# Patient Record
Sex: MALE | Race: WHITE | NOT HISPANIC OR LATINO | Employment: OTHER | URBAN - METROPOLITAN AREA
[De-identification: names, ages, dates, MRNs, and addresses within clinical notes are randomized per-mention and may not be internally consistent; named-entity substitution may affect disease eponyms.]

---

## 2018-12-16 ENCOUNTER — HOSPITAL ENCOUNTER (EMERGENCY)
Facility: HOSPITAL | Age: 28
Discharge: HOME | End: 2018-12-16
Admitting: EMERGENCY MEDICINE

## 2018-12-16 ENCOUNTER — APPOINTMENT (OUTPATIENT)
Dept: CT IMAGING | Facility: HOSPITAL | Age: 28
End: 2018-12-16

## 2018-12-16 VITALS
HEART RATE: 74 BPM | OXYGEN SATURATION: 100 % | HEIGHT: 70 IN | RESPIRATION RATE: 18 BRPM | WEIGHT: 180 LBS | TEMPERATURE: 99.4 F | SYSTOLIC BLOOD PRESSURE: 112 MMHG | BODY MASS INDEX: 25.77 KG/M2 | DIASTOLIC BLOOD PRESSURE: 68 MMHG

## 2018-12-16 DIAGNOSIS — A08.4 VIRAL GASTROENTERITIS: Primary | ICD-10-CM

## 2018-12-16 LAB
ALBUMIN SERPL-MCNC: 4.5 G/DL (ref 3.5–5.2)
ALBUMIN/GLOB SERPL: 1.5 G/DL
ALP SERPL-CCNC: 62 U/L (ref 40–129)
ALT SERPL W P-5'-P-CCNC: 18 U/L (ref 5–41)
ANION GAP SERPL CALCULATED.3IONS-SCNC: 17.3 MMOL/L
AST SERPL-CCNC: 21 U/L (ref 5–40)
BACTERIA UR QL AUTO: ABNORMAL /HPF
BASOPHILS # BLD AUTO: 0.03 10*3/MM3 (ref 0–0.2)
BASOPHILS NFR BLD AUTO: 0.3 % (ref 0–2)
BILIRUB SERPL-MCNC: 1.4 MG/DL (ref 0.2–1.2)
BILIRUB UR QL STRIP: ABNORMAL
BUN BLD-MCNC: 22 MG/DL (ref 6–20)
BUN/CREAT SERPL: 17.6 (ref 7–25)
CALCIUM SPEC-SCNC: 9.3 MG/DL (ref 8.6–10.5)
CHLORIDE SERPL-SCNC: 102 MMOL/L (ref 98–107)
CLARITY UR: CLEAR
CO2 SERPL-SCNC: 22.7 MMOL/L (ref 22–29)
COLOR UR: YELLOW
CREAT BLD-MCNC: 1.25 MG/DL (ref 0.76–1.27)
DEPRECATED RDW RBC AUTO: 38.1 FL (ref 37–54)
EOSINOPHIL # BLD AUTO: 0.01 10*3/MM3 (ref 0.1–0.3)
EOSINOPHIL NFR BLD AUTO: 0.1 % (ref 0–4)
ERYTHROCYTE [DISTWIDTH] IN BLOOD BY AUTOMATED COUNT: 11.9 % (ref 11.5–14.5)
GFR SERPL CREATININE-BSD FRML MDRD: 69 ML/MIN/1.73
GLOBULIN UR ELPH-MCNC: 3 GM/DL
GLUCOSE BLD-MCNC: 113 MG/DL (ref 65–99)
GLUCOSE UR STRIP-MCNC: NEGATIVE MG/DL
HCT VFR BLD AUTO: 51.1 % (ref 42–52)
HGB BLD-MCNC: 17.7 G/DL (ref 14–18)
HGB UR QL STRIP.AUTO: ABNORMAL
HOLD SPECIMEN: NORMAL
HOLD SPECIMEN: NORMAL
HYALINE CASTS UR QL AUTO: ABNORMAL /LPF
IMM GRANULOCYTES # BLD: 0.03 10*3/MM3 (ref 0–0.03)
IMM GRANULOCYTES NFR BLD: 0.3 % (ref 0–0.5)
KETONES UR QL STRIP: ABNORMAL
LEUKOCYTE ESTERASE UR QL STRIP.AUTO: NEGATIVE
LIPASE SERPL-CCNC: 17 U/L (ref 13–60)
LYMPHOCYTES # BLD AUTO: 0.39 10*3/MM3 (ref 0.6–4.8)
LYMPHOCYTES NFR BLD AUTO: 3.3 % (ref 20–45)
MCH RBC QN AUTO: 30.6 PG (ref 27–31)
MCHC RBC AUTO-ENTMCNC: 34.6 G/DL (ref 31–37)
MCV RBC AUTO: 88.4 FL (ref 80–94)
MONOCYTES # BLD AUTO: 0.5 10*3/MM3 (ref 0–1)
MONOCYTES NFR BLD AUTO: 4.2 % (ref 3–8)
MUCOUS THREADS URNS QL MICRO: ABNORMAL /HPF
NEUTROPHILS # BLD AUTO: 11.01 10*3/MM3 (ref 1.5–8.3)
NEUTROPHILS NFR BLD AUTO: 91.8 % (ref 45–70)
NITRITE UR QL STRIP: NEGATIVE
NRBC BLD MANUAL-RTO: 0 /100 WBC (ref 0–0)
PH UR STRIP.AUTO: 5.5 [PH] (ref 4.5–8)
PLATELET # BLD AUTO: 142 10*3/MM3 (ref 140–500)
PMV BLD AUTO: 11 FL (ref 7.4–10.4)
POTASSIUM BLD-SCNC: 4 MMOL/L (ref 3.5–5.2)
PROT SERPL-MCNC: 7.5 G/DL (ref 6–8.5)
PROT UR QL STRIP: NEGATIVE
RBC # BLD AUTO: 5.78 10*6/MM3 (ref 4.7–6.1)
RBC # UR: ABNORMAL /HPF
REF LAB TEST METHOD: ABNORMAL
SODIUM BLD-SCNC: 142 MMOL/L (ref 136–145)
SP GR UR STRIP: 1.02 (ref 1–1.03)
SQUAMOUS #/AREA URNS HPF: ABNORMAL /HPF
UROBILINOGEN UR QL STRIP: ABNORMAL
WBC NRBC COR # BLD: 11.97 10*3/MM3 (ref 4.8–10.8)
WBC UR QL AUTO: ABNORMAL /HPF
WHOLE BLOOD HOLD SPECIMEN: NORMAL
WHOLE BLOOD HOLD SPECIMEN: NORMAL

## 2018-12-16 PROCEDURE — 0 IOPAMIDOL PER 1 ML: Performed by: NURSE PRACTITIONER

## 2018-12-16 PROCEDURE — 81001 URINALYSIS AUTO W/SCOPE: CPT | Performed by: NURSE PRACTITIONER

## 2018-12-16 PROCEDURE — 25010000002 MORPHINE PER 10 MG: Performed by: EMERGENCY MEDICINE

## 2018-12-16 PROCEDURE — 80053 COMPREHEN METABOLIC PANEL: CPT | Performed by: NURSE PRACTITIONER

## 2018-12-16 PROCEDURE — 74177 CT ABD & PELVIS W/CONTRAST: CPT

## 2018-12-16 PROCEDURE — 99283 EMERGENCY DEPT VISIT LOW MDM: CPT

## 2018-12-16 PROCEDURE — 85025 COMPLETE CBC W/AUTO DIFF WBC: CPT | Performed by: NURSE PRACTITIONER

## 2018-12-16 PROCEDURE — 25010000002 ONDANSETRON PER 1 MG: Performed by: NURSE PRACTITIONER

## 2018-12-16 PROCEDURE — 83690 ASSAY OF LIPASE: CPT | Performed by: NURSE PRACTITIONER

## 2018-12-16 PROCEDURE — 96361 HYDRATE IV INFUSION ADD-ON: CPT

## 2018-12-16 PROCEDURE — 99284 EMERGENCY DEPT VISIT MOD MDM: CPT | Performed by: NURSE PRACTITIONER

## 2018-12-16 PROCEDURE — 96375 TX/PRO/DX INJ NEW DRUG ADDON: CPT

## 2018-12-16 PROCEDURE — 96374 THER/PROPH/DIAG INJ IV PUSH: CPT

## 2018-12-16 RX ORDER — ONDANSETRON 2 MG/ML
4 INJECTION INTRAMUSCULAR; INTRAVENOUS ONCE
Status: COMPLETED | OUTPATIENT
Start: 2018-12-16 | End: 2018-12-16

## 2018-12-16 RX ORDER — SODIUM CHLORIDE 0.9 % (FLUSH) 0.9 %
10 SYRINGE (ML) INJECTION AS NEEDED
Status: DISCONTINUED | OUTPATIENT
Start: 2018-12-16 | End: 2018-12-16 | Stop reason: HOSPADM

## 2018-12-16 RX ORDER — LOPERAMIDE HYDROCHLORIDE 2 MG/1
2 CAPSULE ORAL 4 TIMES DAILY PRN
Qty: 20 CAPSULE | Refills: 0 | Status: SHIPPED | OUTPATIENT
Start: 2018-12-16 | End: 2018-12-21

## 2018-12-16 RX ORDER — ONDANSETRON 4 MG/1
4 TABLET, ORALLY DISINTEGRATING ORAL EVERY 6 HOURS PRN
Qty: 12 TABLET | Refills: 0 | Status: SHIPPED | OUTPATIENT
Start: 2018-12-16 | End: 2021-08-31

## 2018-12-16 RX ADMIN — MORPHINE SULFATE 4 MG: 4 INJECTION, SOLUTION INTRAMUSCULAR; INTRAVENOUS at 17:53

## 2018-12-16 RX ADMIN — IOPAMIDOL 100 ML: 755 INJECTION, SOLUTION INTRAVENOUS at 18:20

## 2018-12-16 RX ADMIN — SODIUM CHLORIDE 1000 ML: 9 INJECTION, SOLUTION INTRAVENOUS at 17:49

## 2018-12-16 RX ADMIN — ONDANSETRON 4 MG: 2 INJECTION, SOLUTION INTRAMUSCULAR; INTRAVENOUS at 17:51

## 2018-12-16 NOTE — ED PROVIDER NOTES
Subjective   History of Present Illness  History of Present Illness    Chief complaint: nausea, vomiting, abdominal pain    Location: general abdomen    Quality/Severity:  moderate    Timing/Duration: started today this morning     Modifying Factors: nothing makes it worse or better     Associated Symptoms: none    Narrative: 28 year old male accompanied with his wife with complaints of abdominal pain along with vomiting and diarrhea that started today.  His wife stated she has been ill and vomited yesterday and their child has also been sick with similar symptoms.  The patient is sobbing and whaling loudly in the room.  He reports no allergies, no fever, and no abdominal surgeries.   No dysuria, no bleeding seen in bowels or urine.      Review of Systems  General: Denies fevers or chills.  Denies any weakness or fatigue.  Denies any weight loss or weight gain.  SKIN: Denies any rashes lesions or ulcers.  Denies color change.  ENT: Denies sore throat or rhinorrhea.  Denies ear pain.    EYES: Denies any blurred vision.  Denies any change in vision.  Denies any photophobia.  Denies any vision loss.  LUNGS: Denies any shortness of breath or wheezing.  Denies any cough.  Denies any hemoptysis.  CARDIAC: Denies any chest pain.  Denies palpitations.  Denies syncope.  Denies any edema  ABD: + abdominal pain.  + nausea, vomiting, and  diarrhea.  Denies any rectal bleeding.  Denies constipation  : Denies any dysuria, urgency, frequency or hematuria.  Denies discharge.  Denies flank pain.  NEURO: Denies any focal weakness.  Denies headache.  Denies seizures.  Denies changes in speech or difficulty walking.  ENDOCRINE: Denies polydipsia and polyuria  M/S: Denies arthralgias, back pain, myalgias or neck pain  HEME/LYMPH: Negative for adenopathy. Does not bruise/bleed easily.   PSYCH: Negative for suicidal ideas. Denies anxiety or depression  review was performed in addition to those in the above all other reviews are  negative.    History reviewed. No pertinent past medical history.    No Known Allergies    Past Surgical History:   Procedure Laterality Date   • SHOULDER ARTHROSCOPY         History reviewed. No pertinent family history.    Social History     Socioeconomic History   • Marital status:      Spouse name: Not on file   • Number of children: Not on file   • Years of education: Not on file   • Highest education level: Not on file   Tobacco Use   • Smoking status: Never Smoker   Substance and Sexual Activity   • Alcohol use: No     Frequency: Never   • Drug use: No       Current Facility-Administered Medications:   •  morphine injection 4 mg, 4 mg, Intravenous, Once, Marvin Woodruff MD  •  ondansetron (ZOFRAN) injection 4 mg, 4 mg, Intravenous, Once, Viviana Barlow APRN  •  sodium chloride 0.9 % bolus 1,000 mL, 1,000 mL, Intravenous, Once, Viviana Barlow APRN  •  sodium chloride 0.9 % flush 10 mL, 10 mL, Intravenous, PRN, Viviana Barlow APRN  No current outpatient medications on file.    Vitals:    12/16/18 1711   BP: 110/76   Pulse: 106   Resp: 20   Temp: 98 °F (36.7 °C)   SpO2: 98%           Objective   Physical Exam  General: NAD, alert and oriented x 4  HEENT: NCAT, PERRL, oral mucosa moist  Cardiac: S1, S2, RRR, no murmur, no edema  Pulmonary: CTA bilaterally, no wheezing   Abdomen: soft, non-tender, non-distended, no organmegally, no rebound tenderness   : Voids independently, no CVA tenderness   Musculoskeletal: Moves all extremities, equal strength bilaterally  Neuro: alert and oriented x 3, CN 2 - 12 grossly intact  Skin: warm, dry, and intact    Procedures           ED Course    Patient seen and examined.  Labs ordered along with 1 liter normal saline, 4 mg IV morphine, 4 mg of IV Zofran, and a CT abdomen/pelvis with IV contrast.    Reviewed CT abdomen/pelvis W IV contrast. Independently viewed by me. Interpreted by radiologist, Dr. Perdomo. Discussed with patient.  Normal Appendix, Fluid filled  loops of small bowel, otherwise normal.    Given patient symptoms and negative abdomen at this time I feel that he has a viral infection.  Spouse and child also with similar symptoms in house.  Will discharge the patient home with zofran as well as loperamide.  Advised to stay hydrated with water.     He does not have a PCP, provided him information with physicians in the area.    Return to the ED with any worsening symptoms.      Return to the emergency department with worsening symptoms, uncontrolled pain, inability to tolerate oral liquids, fever greater than 101°F not controlled by Tylenol or as needed with emergent concerns.    Results for orders placed or performed during the hospital encounter of 12/16/18   Comprehensive Metabolic Panel   Result Value Ref Range    Glucose 113 (H) 65 - 99 mg/dL    BUN 22 (H) 6 - 20 mg/dL    Creatinine 1.25 0.76 - 1.27 mg/dL    Sodium 142 136 - 145 mmol/L    Potassium 4.0 3.5 - 5.2 mmol/L    Chloride 102 98 - 107 mmol/L    CO2 22.7 22.0 - 29.0 mmol/L    Calcium 9.3 8.6 - 10.5 mg/dL    Total Protein 7.5 6.0 - 8.5 g/dL    Albumin 4.50 3.50 - 5.20 g/dL    ALT (SGPT) 18 5 - 41 U/L    AST (SGOT) 21 5 - 40 U/L    Alkaline Phosphatase 62 40 - 129 U/L    Total Bilirubin 1.4 (H) 0.2 - 1.2 mg/dL    eGFR Non African Amer 69 >60 mL/min/1.73    Globulin 3.0 gm/dL    A/G Ratio 1.5 g/dL    BUN/Creatinine Ratio 17.6 7.0 - 25.0    Anion Gap 17.3 mmol/L   Lipase   Result Value Ref Range    Lipase 17 13 - 60 U/L   Urinalysis With Microscopic If Indicated (No Culture) - Urine, Clean Catch   Result Value Ref Range    Color, UA Yellow Yellow, Straw    Appearance, UA Clear Clear    pH, UA 5.5 4.5 - 8.0    Specific Gravity, UA 1.020 1.003 - 1.030    Glucose, UA Negative Negative    Ketones, UA >=160 mg/dL (4+) Negative, 80 mg/dL (3+), >=160 mg/dL (4+)    Bilirubin, UA Small (1+) (A) Negative    Blood, UA Trace (A) Negative    Protein, UA Negative Negative    Leuk Esterase, UA Negative Negative     Nitrite, UA Negative Negative    Urobilinogen, UA 0.2 E.U./dL 0.2 - 1.0 E.U./dL   CBC Auto Differential   Result Value Ref Range    WBC 11.97 (H) 4.80 - 10.80 10*3/mm3    RBC 5.78 4.70 - 6.10 10*6/mm3    Hemoglobin 17.7 14.0 - 18.0 g/dL    Hematocrit 51.1 42.0 - 52.0 %    MCV 88.4 80.0 - 94.0 fL    MCH 30.6 27.0 - 31.0 pg    MCHC 34.6 31.0 - 37.0 g/dL    RDW 11.9 11.5 - 14.5 %    RDW-SD 38.1 37.0 - 54.0 fl    MPV 11.0 (H) 7.4 - 10.4 fL    Platelets 142 140 - 500 10*3/mm3    Neutrophil % 91.8 (H) 45.0 - 70.0 %    Lymphocyte % 3.3 (L) 20.0 - 45.0 %    Monocyte % 4.2 3.0 - 8.0 %    Eosinophil % 0.1 0.0 - 4.0 %    Basophil % 0.3 0.0 - 2.0 %    Immature Grans % 0.3 0.0 - 0.5 %    Neutrophils, Absolute 11.01 (H) 1.50 - 8.30 10*3/mm3    Lymphocytes, Absolute 0.39 (L) 0.60 - 4.80 10*3/mm3    Monocytes, Absolute 0.50 0.00 - 1.00 10*3/mm3    Eosinophils, Absolute 0.01 (L) 0.10 - 0.30 10*3/mm3    Basophils, Absolute 0.03 0.00 - 0.20 10*3/mm3    Immature Grans, Absolute 0.03 0.00 - 0.03 10*3/mm3    nRBC 0.0 0.0 - 0.0 /100 WBC   Urinalysis, Microscopic Only - Urine, Clean Catch   Result Value Ref Range    RBC, UA 0-2 (A) None Seen /HPF    WBC, UA 0-2 (A) None Seen /HPF    Bacteria, UA Trace (A) None Seen /HPF    Squamous Epithelial Cells, UA 0-2 None Seen, 0-2 /HPF    Hyaline Casts, UA None Seen None Seen /LPF    Mucus, UA Trace None Seen, Trace /HPF    Methodology Manual Light Microscopy    Light Blue Top   Result Value Ref Range    Extra Tube hold for add-on    Green Top (Gel)   Result Value Ref Range    Extra Tube Hold for add-ons.    Lavender Top   Result Value Ref Range    Extra Tube hold for add-on    Gold Top - SST   Result Value Ref Range    Extra Tube Hold for add-ons.                    MDM  Number of Diagnoses or Management Options  Viral gastroenteritis: new and requires workup     Amount and/or Complexity of Data Reviewed  Clinical lab tests: reviewed  Tests in the radiology section of CPT®: reviewed    Risk of  Complications, Morbidity, and/or Mortality  Presenting problems: moderate  Diagnostic procedures: moderate  Management options: moderate    Patient Progress  Patient progress: improved        Final diagnoses:   Viral gastroenteritis            Viviana Barlow, APRN  12/16/18 1922

## 2018-12-17 NOTE — ED NOTES
Assumed care of this patient. No c/o offered at this time. Awaiting disposition     Elsie Villalobos RN  12/16/18 1925

## 2018-12-17 NOTE — DISCHARGE INSTRUCTIONS
Follow up with a primary physician this week.  Take medication as prescribed.  Return to the ED with any worsening symptoms.  Stay hydrated with water.      Return to the emergency department with worsening symptoms, uncontrolled pain, inability to tolerate oral liquids, fever greater than 101° F not controlled by Tylenol or as needed with emergent concerns.

## 2022-03-12 ENCOUNTER — HOSPITAL ENCOUNTER (EMERGENCY)
Facility: HOSPITAL | Age: 32
Discharge: HOME | End: 2022-03-13
Attending: EMERGENCY MEDICINE | Admitting: EMERGENCY MEDICINE

## 2022-03-12 DIAGNOSIS — T75.4XXA ELECTRIC SHOCK, INITIAL ENCOUNTER: ICD-10-CM

## 2022-03-12 DIAGNOSIS — R00.2 PALPITATIONS: Primary | ICD-10-CM

## 2022-03-12 LAB
BASOPHILS # BLD AUTO: 0.04 10*3/MM3 (ref 0–0.2)
BASOPHILS NFR BLD AUTO: 0.6 % (ref 0–1.5)
DEPRECATED RDW RBC AUTO: 41.8 FL (ref 37–54)
EOSINOPHIL # BLD AUTO: 0.16 10*3/MM3 (ref 0–0.4)
EOSINOPHIL NFR BLD AUTO: 2.5 % (ref 0.3–6.2)
ERYTHROCYTE [DISTWIDTH] IN BLOOD BY AUTOMATED COUNT: 12 % (ref 12.3–15.4)
HCT VFR BLD AUTO: 43.5 % (ref 37.5–51)
HGB BLD-MCNC: 14.4 G/DL (ref 13–17.7)
IMM GRANULOCYTES # BLD AUTO: 0 10*3/MM3 (ref 0–0.05)
IMM GRANULOCYTES NFR BLD AUTO: 0 % (ref 0–0.5)
LYMPHOCYTES # BLD AUTO: 2.11 10*3/MM3 (ref 0.7–3.1)
LYMPHOCYTES NFR BLD AUTO: 33 % (ref 19.6–45.3)
MCH RBC QN AUTO: 30.8 PG (ref 26.6–33)
MCHC RBC AUTO-ENTMCNC: 33.1 G/DL (ref 31.5–35.7)
MCV RBC AUTO: 92.9 FL (ref 79–97)
MONOCYTES # BLD AUTO: 0.76 10*3/MM3 (ref 0.1–0.9)
MONOCYTES NFR BLD AUTO: 11.9 % (ref 5–12)
NEUTROPHILS NFR BLD AUTO: 3.32 10*3/MM3 (ref 1.7–7)
NEUTROPHILS NFR BLD AUTO: 52 % (ref 42.7–76)
PLATELET # BLD AUTO: 148 10*3/MM3 (ref 140–450)
PMV BLD AUTO: 10.6 FL (ref 6–12)
RBC # BLD AUTO: 4.68 10*6/MM3 (ref 4.14–5.8)
WBC NRBC COR # BLD: 6.39 10*3/MM3 (ref 3.4–10.8)

## 2022-03-12 PROCEDURE — 80053 COMPREHEN METABOLIC PANEL: CPT | Performed by: EMERGENCY MEDICINE

## 2022-03-12 PROCEDURE — 93005 ELECTROCARDIOGRAM TRACING: CPT | Performed by: EMERGENCY MEDICINE

## 2022-03-12 PROCEDURE — 93010 ELECTROCARDIOGRAM REPORT: CPT | Performed by: INTERNAL MEDICINE

## 2022-03-12 PROCEDURE — 84443 ASSAY THYROID STIM HORMONE: CPT | Performed by: EMERGENCY MEDICINE

## 2022-03-12 PROCEDURE — 99283 EMERGENCY DEPT VISIT LOW MDM: CPT

## 2022-03-12 PROCEDURE — 83735 ASSAY OF MAGNESIUM: CPT | Performed by: EMERGENCY MEDICINE

## 2022-03-12 PROCEDURE — 93005 ELECTROCARDIOGRAM TRACING: CPT

## 2022-03-12 PROCEDURE — 36415 COLL VENOUS BLD VENIPUNCTURE: CPT

## 2022-03-12 PROCEDURE — 85025 COMPLETE CBC W/AUTO DIFF WBC: CPT | Performed by: EMERGENCY MEDICINE

## 2022-03-12 PROCEDURE — 84484 ASSAY OF TROPONIN QUANT: CPT | Performed by: EMERGENCY MEDICINE

## 2022-03-12 RX ORDER — VALACYCLOVIR HYDROCHLORIDE 500 MG/1
500 TABLET, FILM COATED ORAL DAILY
COMMUNITY

## 2022-03-13 VITALS
SYSTOLIC BLOOD PRESSURE: 138 MMHG | DIASTOLIC BLOOD PRESSURE: 82 MMHG | WEIGHT: 182 LBS | HEIGHT: 70 IN | HEART RATE: 61 BPM | RESPIRATION RATE: 16 BRPM | TEMPERATURE: 98.8 F | OXYGEN SATURATION: 99 % | BODY MASS INDEX: 26.05 KG/M2

## 2022-03-13 LAB
ALBUMIN SERPL-MCNC: 4.2 G/DL (ref 3.5–5.2)
ALBUMIN/GLOB SERPL: 1.6 G/DL
ALP SERPL-CCNC: 55 U/L (ref 39–117)
ALT SERPL W P-5'-P-CCNC: 28 U/L (ref 1–41)
ANION GAP SERPL CALCULATED.3IONS-SCNC: 8.5 MMOL/L (ref 5–15)
AST SERPL-CCNC: 29 U/L (ref 1–40)
BILIRUB SERPL-MCNC: 0.4 MG/DL (ref 0–1.2)
BUN SERPL-MCNC: 26 MG/DL (ref 6–20)
BUN/CREAT SERPL: 22.6 (ref 7–25)
CALCIUM SPEC-SCNC: 9.7 MG/DL (ref 8.6–10.5)
CHLORIDE SERPL-SCNC: 102 MMOL/L (ref 98–107)
CO2 SERPL-SCNC: 27.5 MMOL/L (ref 22–29)
CREAT SERPL-MCNC: 1.15 MG/DL (ref 0.76–1.27)
EGFRCR SERPLBLD CKD-EPI 2021: 87.8 ML/MIN/1.73
GLOBULIN UR ELPH-MCNC: 2.7 GM/DL
GLUCOSE SERPL-MCNC: 88 MG/DL (ref 65–99)
MAGNESIUM SERPL-MCNC: 1.9 MG/DL (ref 1.6–2.6)
POTASSIUM SERPL-SCNC: 3.7 MMOL/L (ref 3.5–5.2)
PROT SERPL-MCNC: 6.9 G/DL (ref 6–8.5)
QT INTERVAL: 420 MS
QT INTERVAL: 425 MS
SODIUM SERPL-SCNC: 138 MMOL/L (ref 136–145)
TROPONIN T SERPL-MCNC: <0.01 NG/ML (ref 0–0.03)
TSH SERPL DL<=0.05 MIU/L-ACNC: 7.45 UIU/ML (ref 0.27–4.2)

## 2022-03-13 PROCEDURE — 99283 EMERGENCY DEPT VISIT LOW MDM: CPT | Performed by: EMERGENCY MEDICINE

## 2022-03-13 NOTE — ED PROVIDER NOTES
"Subjective   31-year-old male presents for evaluation after having electrical shock earlier this afternoon.  Patient states that around 3 PM he was working on a light fixture that he thought was off when he touched a live wire and was shocked.  He states he briefly became lightheaded but did not lose consciousness.  Patient states that tonight when he went to lay down to go to bed he became concerned about going to sleep so decided to seek evaluation.  He denies chest pain, shortness of breath, headache, weakness, numbness, tingling, nausea, vomiting, muscle aches, joint pain.  Patient did state that his heart \"felt funny\" but he cannot elaborate further on the sensation.  Patient reports history of hypothyroidism.  He denies smoking, illicit drug use, alcohol use.  Patient has not taken any medication prior to arrival.          Review of Systems   All other systems reviewed and are negative.      Past Medical History:   Diagnosis Date   • Genital herpes    • History of Chiari malformation    • Hypothyroidism        No Known Allergies    Past Surgical History:   Procedure Laterality Date   • BRAIN SURGERY     • HERNIA REPAIR  04/2021   • SHOULDER ARTHROSCOPY         History reviewed. No pertinent family history.    Social History     Socioeconomic History   • Marital status:    Tobacco Use   • Smoking status: Never Smoker   • Smokeless tobacco: Never Used   Vaping Use   • Vaping Use: Never used   Substance and Sexual Activity   • Alcohol use: No   • Drug use: No   • Sexual activity: Defer           Objective   Physical Exam  Constitutional:       General: He is not in acute distress.     Appearance: Normal appearance. He is not ill-appearing, toxic-appearing or diaphoretic.   HENT:      Head: Normocephalic and atraumatic.      Mouth/Throat:      Mouth: Mucous membranes are moist.      Pharynx: Oropharynx is clear.   Eyes:      Extraocular Movements: Extraocular movements intact.      Pupils: Pupils are equal, " round, and reactive to light.   Cardiovascular:      Rate and Rhythm: Normal rate and regular rhythm.      Pulses: Normal pulses.      Heart sounds: Normal heart sounds.   Pulmonary:      Effort: Pulmonary effort is normal. No respiratory distress.      Breath sounds: Normal breath sounds.   Abdominal:      General: There is no distension.      Palpations: Abdomen is soft.      Tenderness: There is no abdominal tenderness.   Musculoskeletal:         General: No tenderness, deformity or signs of injury. Normal range of motion.   Skin:     General: Skin is warm and dry.      Comments: Intact   Neurological:      General: No focal deficit present.      Mental Status: He is alert and oriented to person, place, and time. Mental status is at baseline.      Cranial Nerves: No cranial nerve deficit.      Sensory: No sensory deficit.      Motor: No weakness.      Coordination: Coordination normal.      Gait: Gait normal.   Psychiatric:         Mood and Affect: Mood normal.         Behavior: Behavior normal.         Thought Content: Thought content normal.         Judgment: Judgment normal.         Procedures           ED Course  ED Course as of 03/13/22 0043   Sun Mar 13, 2022   0041 EKG obtained at 2348 shows sinus rhythm, rate 69, normal TX and QTC, no ST segment or T wave changes.  Leftward axis. [TD]   0042 Patient overall well-appearing.  Patient has no specific complaint at time of my evaluation.  Work-up here shows slightly elevated TSH but is otherwise unremarkable.  Patient was electrocuted by household 110 voltage which should not cause any issue other than some localized injury, which patient does not have.  Provided reassurance.  Patient stable for discharge. [TD]      ED Course User Index  [TD] Dejan Gillette MD                                                 [facility]    Final diagnoses:   Palpitations   Electric shock, initial encounter       ED Disposition  ED Disposition     ED Disposition   Discharge     Condition   Stable    Comment   --             Primary Care    In 2 days  As needed         Medication List      No changes were made to your prescriptions during this visit.          Dejan Gillette MD  03/13/22 0043

## 2022-03-13 NOTE — ED TRIAGE NOTES
"Reports he was shocked by an electrical outlet while working on them at home at approximately 3p today. He denies CP, SOA, and pain, but states, \"I'm really bad at describing things, I just feel off.\"    Alert, oriented, no distress.  "

## 2024-02-19 ENCOUNTER — PATIENT ROUNDING (BHMG ONLY) (OUTPATIENT)
Dept: FAMILY MEDICINE CLINIC | Facility: CLINIC | Age: 34
End: 2024-02-19
Payer: SELF-PAY

## 2024-02-19 ENCOUNTER — OFFICE VISIT (OUTPATIENT)
Dept: FAMILY MEDICINE CLINIC | Facility: CLINIC | Age: 34
End: 2024-02-19
Payer: SELF-PAY

## 2024-02-19 VITALS
SYSTOLIC BLOOD PRESSURE: 112 MMHG | HEART RATE: 55 BPM | DIASTOLIC BLOOD PRESSURE: 68 MMHG | RESPIRATION RATE: 20 BRPM | BODY MASS INDEX: 28.06 KG/M2 | WEIGHT: 196 LBS | OXYGEN SATURATION: 100 % | HEIGHT: 70 IN

## 2024-02-19 DIAGNOSIS — E03.9 ACQUIRED HYPOTHYROIDISM: ICD-10-CM

## 2024-02-19 DIAGNOSIS — G93.5 CHIARI MALFORMATION TYPE I: ICD-10-CM

## 2024-02-19 DIAGNOSIS — R29.6 FALLING: Primary | ICD-10-CM

## 2024-02-19 PROCEDURE — 36415 COLL VENOUS BLD VENIPUNCTURE: CPT | Performed by: STUDENT IN AN ORGANIZED HEALTH CARE EDUCATION/TRAINING PROGRAM

## 2024-02-19 PROCEDURE — 99204 OFFICE O/P NEW MOD 45 MIN: CPT | Performed by: STUDENT IN AN ORGANIZED HEALTH CARE EDUCATION/TRAINING PROGRAM

## 2024-02-19 NOTE — PROGRESS NOTES
Venipuncture Blood Specimen Collection  Venipuncture performed in left arm by Jess Simpson MA with good hemostasis. Patient tolerated the procedure well without complications.   02/19/24   Jess Simpson MA

## 2024-02-19 NOTE — PROGRESS NOTES
Jovi Li,   [facility]  [address] [place] [address] [address]  [place] [id number] [phone]    Subjective      Name Matias Barrientos MRN [id number]    1990 AGE/SEX 33 y.o. / male      Chief Complaint Chief Complaint   Patient presents with    Balance Issues     New patient here to establish care. Has concerns about falling a lot in the past few months as well as having seizure like symptoms and shaking.          Visit History for  2024    History of Present Illness  Matias Barrientos is a 33 y.o. male who presented today for Balance Issues (New patient here to establish care. Has concerns about falling a lot in the past few months as well as having seizure like symptoms and shaking. )    Has been falling more and more since 2023. Has been tracking this for the last 5 weeks and falls about 2.8 times a week on average. This average only includes falls to the ground and not times that recovered before falling. Started recording falls since 2024. Greater than 60 percent of the falls occur within 30 seconds of standing up. Usually always falls on the right side. Right knee gives out and a lot of times can recover before fully falling. For the last 8 months, has been shaking mostly on the right side of body, usually from head to shoulder and upper extremity. Sometimes when going to bed, whole body will shake.     In , went to the [facility] for migraines. Was prescribed a medication and does not remember the name, and the medication caused severe hand tremors and ticks. This mostly alleviated, but it took years.     In , had a Chiari malformation decompression surgery and that is when the weakness in bilateral knees began. Had 75 percent of left brain and tonsils removed. Had a CT scan done after falling 4 months after brain surgery. Was incoherent for 10 minutes after falling. Last full MRI was done by the [facility] in .    Fell a few times  immediately after surgery. Knees would start to give out, which persisted for a few years. This alleviated, but now is experiencing the same feeling. When falls, cannot necessarily always recover. Occasionally can stand up with left lower extremity. Occasionally can recover and keep walking, but other times cannot. The shaking started in the beginning of the summer of 2023. Felt the first fall in 12/2023 but was sick. Has not seen anyone for this in the past.     On 02/13/2024, was ambulating and riding go carts, and then fell while walking out to the parking lot. Does not get lightheaded when it happens. Does not feel like the room is spinning.     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 The episodes do not affect work. Does not feel weak. Short-term memory has not been great for the last few years but denies any neurological deficits.     Still gets headaches. On average, gets 4 headaches a week.Will take ibuprofen, which usually works well. Also gets massages every 2 weeks, primarily focusing on neck and upper back. Headaches are usually at the base of the skull. Surgery involved removing part of skull and reattaching the muscles to new locations. Was on thyroid medication at one point, but it kept making sick. Got the medication changed at one point to account for T3 reverse, but it still made sick. Has been on a thyroid supplement. Cannot take vitamin B that is not methylated. Takes methylene vitamin B and fish oil. Has not been told has metabolic problems.     Is not aware of any hormonal issues. Was told by one doctor that has low testosterone levels, however does not have any problem producing sperm. Does not follow any type of diet, however generally eats healthy.     Had a sleep study done because of fatigue, however attributes this to waking up early and sleeping late. The sleep study was normal.  "Is not aware of any palpitations.     Has a new tic where lifting right shoulder. Is occasionally aware of it.      Medications and Allergies   No current outpatient medications    No Known Allergies   I have reviewed the above medications and allergies     Objective:      Vitals Vitals:    02/19/24 1319   BP: 112/68   BP Location: Right arm   Patient Position: Sitting   Cuff Size: Adult   Pulse: 55   Resp: 20   SpO2: 100%   Weight: 88.9 kg (196 lb)   Height: 177.8 cm (70\")     Body mass index is 28.12 kg/m².    Physical Exam  Vitals reviewed.   Constitutional:       General: He is not in acute distress.     Appearance: He is not ill-appearing.   Cardiovascular:      Rate and Rhythm: Normal rate and regular rhythm.   Pulmonary:      Effort: Pulmonary effort is normal.      Breath sounds: Normal breath sounds.   Neurological:      Mental Status: He is oriented to person, place, and time.      Coordination: Finger-Nose-Finger Test and Heel to Shin Test normal.      Comments: Patient has difficulty balancing on right foot especially with eyes closed.  Otherwise normal exam   Psychiatric:         Mood and Affect: Mood normal.         Behavior: Behavior normal.         Thought Content: Thought content normal.         Judgment: Judgment normal.            Assessment/Plan      Issues Addressed/ Plan   Diagnosis Plan   1. Falling  Ambulatory Referral to Neurology    Comprehensive metabolic panel    CBC w AUTO Differential    MRI Brain With & Without Contrast      2. Chiari malformation type I  Ambulatory Referral to Neurology    MRI Brain With & Without Contrast      3. Acquired hypothyroidism  TSH    T4, free    T3, free         1. Frequent falls   - Referred to Dr. Jacobs, neurologist. Will bring MRI and CT scans to appointment with neurologist. Blood work will be obtained today, including a complete metabolic panel, liver function, and kidney function. An MRI with and without contrast has been " ordered.    Follow-up  Will follow up as needed.    There are no Patient Instructions on file for this visit.  BMI is >= 25 and <30. (Overweight) The following options were offered after discussion;: exercise counseling/recommendations and nutrition counseling/recommendations         Follow up  recommended Return in about 3 months (around 5/19/2024), or if symptoms worsen or fail to improve.   - Dragon voice recognition software was utilized to complete this chart.  Every reasonable attempt was made to edit and correct the text, however some incorrect words may remain.   Jovi Li DO      Transcribed from ambient dictation for Jovi Li DO by Allyson Rizvi.  02/19/24   16:52 EST    Patient or patient representative verbalized consent to the visit recording.  I have personally performed the services described in this document as transcribed by the above individual, and it is both accurate and complete.

## 2024-02-19 NOTE — PROGRESS NOTES
A Triage message has been sent to the patient for PATIENT ROUNDING with [facility]

## 2024-02-20 LAB
T3FREE SERPL-MCNC: 3.8 PG/ML (ref 2–4.4)
T4 FREE SERPL-MCNC: 1.33 NG/DL (ref 0.82–1.77)
TSH SERPL DL<=0.005 MIU/L-ACNC: 5.5 UIU/ML (ref 0.45–4.5)

## 2024-02-21 LAB
ALBUMIN SERPL-MCNC: 4.8 G/DL (ref 4.1–5.1)
ALBUMIN/GLOB SERPL: 1.8 {RATIO} (ref 1.2–2.2)
ALP SERPL-CCNC: 70 IU/L (ref 44–121)
ALT SERPL-CCNC: 28 IU/L (ref 0–44)
AST SERPL-CCNC: 26 IU/L (ref 0–40)
BASOPHILS # BLD AUTO: 0 X10E3/UL (ref 0–0.2)
BASOPHILS NFR BLD AUTO: 1 %
BILIRUB SERPL-MCNC: 0.5 MG/DL (ref 0–1.2)
BUN SERPL-MCNC: 15 MG/DL (ref 6–20)
BUN/CREAT SERPL: 13 (ref 9–20)
CALCIUM SERPL-MCNC: 10.3 MG/DL (ref 8.7–10.2)
CHLORIDE SERPL-SCNC: 102 MMOL/L (ref 96–106)
CO2 SERPL-SCNC: 22 MMOL/L (ref 20–29)
CREAT SERPL-MCNC: 1.12 MG/DL (ref 0.76–1.27)
EGFRCR SERPLBLD CKD-EPI 2021: 90 ML/MIN/1.73
EOSINOPHIL # BLD AUTO: 0.1 X10E3/UL (ref 0–0.4)
EOSINOPHIL NFR BLD AUTO: 2 %
ERYTHROCYTE [DISTWIDTH] IN BLOOD BY AUTOMATED COUNT: 12.2 % (ref 11.6–15.4)
GLOBULIN SER CALC-MCNC: 2.6 G/DL (ref 1.5–4.5)
GLUCOSE SERPL-MCNC: 90 MG/DL (ref 70–99)
HCT VFR BLD AUTO: 48.4 % (ref 37.5–51)
HGB BLD-MCNC: 16 G/DL (ref 13–17.7)
IMM GRANULOCYTES # BLD AUTO: 0 X10E3/UL (ref 0–0.1)
IMM GRANULOCYTES NFR BLD AUTO: 0 %
LYMPHOCYTES # BLD AUTO: 1.2 X10E3/UL (ref 0.7–3.1)
LYMPHOCYTES NFR BLD AUTO: 23 %
MCH RBC QN AUTO: 29.5 PG (ref 26.6–33)
MCHC RBC AUTO-ENTMCNC: 33.1 G/DL (ref 31.5–35.7)
MCV RBC AUTO: 89 FL (ref 79–97)
MONOCYTES # BLD AUTO: 0.4 X10E3/UL (ref 0.1–0.9)
MONOCYTES NFR BLD AUTO: 8 %
NEUTROPHILS # BLD AUTO: 3.6 X10E3/UL (ref 1.4–7)
NEUTROPHILS NFR BLD AUTO: 66 %
PLATELET # BLD AUTO: 167 X10E3/UL (ref 150–450)
POTASSIUM SERPL-SCNC: 4.4 MMOL/L (ref 3.5–5.2)
PROT SERPL-MCNC: 7.4 G/DL (ref 6–8.5)
RBC # BLD AUTO: 5.43 X10E6/UL (ref 4.14–5.8)
SODIUM SERPL-SCNC: 143 MMOL/L (ref 134–144)
WBC # BLD AUTO: 5.3 X10E3/UL (ref 3.4–10.8)
WRITTEN AUTHORIZATION: NORMAL

## 2024-02-22 ENCOUNTER — TELEPHONE (OUTPATIENT)
Dept: FAMILY MEDICINE CLINIC | Facility: CLINIC | Age: 34
End: 2024-02-22
Payer: SELF-PAY

## 2024-02-22 NOTE — TELEPHONE ENCOUNTER
Caller: Matias Barrientos    Relationship: Self    Best call back number: [phone]    What is the best time to reach you: ANYTIME     Who are you requesting to speak with (clinical staff, provider,  specific staff member): CLINICAL     What was the call regarding: PATIENT IS CALLING TO INFORM HIS PCP THAT HE HAS BEEN SCHEDULED WITH NEUROLOGY FOR 4/18/24 AT 1PM.

## 2024-03-06 ENCOUNTER — PATIENT MESSAGE (OUTPATIENT)
Dept: FAMILY MEDICINE CLINIC | Facility: CLINIC | Age: 34
End: 2024-03-06
Payer: SELF-PAY

## 2024-03-07 ENCOUNTER — TELEPHONE (OUTPATIENT)
Dept: INTERNAL MEDICINE | Facility: CLINIC | Age: 34
End: 2024-03-07
Payer: SELF-PAY

## 2024-03-07 NOTE — TELEPHONE ENCOUNTER
From: Matias Barrientos  To: Jovi Li  Sent: 3/6/2024 10:54 PM EST  Subject: MRI and neuro appt    Just wanted to follow up and ask if there was anything you'd recommend I be doing or watching for before my neurology appointment. I was also wondering if it's possible to get an earlier appt than April 18th, just seems far away to try to figure this out.

## 2024-03-07 NOTE — TELEPHONE ENCOUNTER
----- Message from Matias Barrientos sent at 3/6/2024 10:54 PM EST -----  Regarding: MRI and neuro appt  Contact: [phone]  Just wanted to follow up and ask if there was anything you'd recommend I be doing or watching for before my neurology appointment. I was also wondering if it's possible to get an earlier appt than April 18th, just seems far away to try to figure this out.

## 2024-03-07 NOTE — TELEPHONE ENCOUNTER
Due to his increasing symptoms, I would try to call their office and see if we can get an earlier appointment for him based on his recent symptoms and history.  Patient can also try to call and get put on a wait list to be seen sooner.

## 2024-04-18 ENCOUNTER — OFFICE VISIT (OUTPATIENT)
Dept: NEUROLOGY | Facility: CLINIC | Age: 34
End: 2024-04-18

## 2024-04-18 VITALS
WEIGHT: 201.8 LBS | DIASTOLIC BLOOD PRESSURE: 82 MMHG | OXYGEN SATURATION: 99 % | SYSTOLIC BLOOD PRESSURE: 124 MMHG | HEART RATE: 122 BPM | HEIGHT: 70 IN | BODY MASS INDEX: 28.89 KG/M2

## 2024-04-18 DIAGNOSIS — G93.5 CHIARI MALFORMATION TYPE I: ICD-10-CM

## 2024-04-18 DIAGNOSIS — R25.8 CLONUS: Primary | ICD-10-CM

## 2024-04-18 DIAGNOSIS — R29.6 FALLS FREQUENTLY: ICD-10-CM

## 2024-04-18 DIAGNOSIS — R25.2 SPASTICITY: ICD-10-CM

## 2024-04-18 NOTE — PROGRESS NOTES
Notes by LPN:  Patient referred for Chiari and falls. He reports 44 times since January. He reports muscle spasms on the right side of his body. That happens daily, lasts less than 30 seconds. 23 headaches in March 3rd. Possible seizure. He says he collapsed, lost some time, and when he woke up his legs were still shaking. When he falls, states his right leg just gives out and at times, he doesn't have any feeling in the right leg. No dizziness or light headedness,              Subjective:     Dear Dr. Li, thank you for referring Mr. Barrientos for consultation.  As you know, he is a 33-year-old gentleman with a history of Chiari malformation status post decompression in 2015 with suboccipital craniectomy.  Over the last few months he has begun to develop falls.  He says that he is fallen 44 times and then his right leg gives out unexpectedly.  He does not lose consciousness.  He also has muscle twitches and spasms of the right shoulder and upper extremity the last 20 to 30 seconds and I was able to review a video of these.  They appeared to be a little bit longer than would be expected for myoclonic jerks but were regional in nature involving abduction of the right shoulder, right shoulder left, contraction of the elbow.  There does not appear to be a clear inciting or alleviating factor.  He does have a history of C6-7 disc disease noted on MRI in 2018 which was touching the cord but not causing cord compression at that time.  This was more left than right.  He has had a follow-up MRI which I reviewed.  This was performed at [facility] on February 26 of this year.  No acute process was seen.  Postsurgical changes in the suboccipital area with a craniectomy was noted.  Small areas of encephalomalacia were seen in the inferior cerebellar hemispheres greater on the left than on the right consistent with the patient's history.  Brain volume was unremarkable.  There were a few scattered punctate foci of T2  hyperintensity noted.  Patient ID: Matias Barrientos is a 33 y.o. male.    Fall  Pertinent negatives include no abdominal pain, headaches, nausea, numbness or vomiting.     The following portions of the patient's history were reviewed and updated as appropriate: allergies, current medications, past family history, past medical history, past social history, past surgical history, and problem list.    Review of Systems   Constitutional:  Positive for fatigue. Negative for activity change and appetite change.   HENT:  Negative for facial swelling, trouble swallowing and voice change.    Eyes:  Negative for photophobia, pain and visual disturbance.   Respiratory:  Negative for chest tightness, shortness of breath and wheezing.    Cardiovascular:  Negative for chest pain, palpitations and leg swelling.   Gastrointestinal:  Negative for abdominal pain, nausea and vomiting.   Endocrine: Negative for cold intolerance and heat intolerance.   Musculoskeletal:  Positive for back pain and myalgias. Negative for arthralgias, gait problem, joint swelling, neck pain and neck stiffness.   Neurological:  Negative for dizziness, tremors, seizures, syncope, facial asymmetry, speech difficulty, weakness, light-headedness, numbness and headaches.   Hematological:  Does not bruise/bleed easily.   Psychiatric/Behavioral:  Negative for agitation, behavioral problems, confusion, decreased concentration, dysphoric mood, hallucinations, self-injury, sleep disturbance and suicidal ideas. The patient is not nervous/anxious and is not hyperactive.         Objective:    Neurological Exam  Awake alert pleasant cooperative oriented in all spheres with fluent speech and normal speech comprehension.    Cranial nerves II through XII normal and symmetric.    Motor exam reveals normal symmetric tone bulk and power throughout without drift or spasticity.  No adventitious movements.    The sensory exam reveals normal and symmetric sensation to light touch,  temperature, vibration throughout.    Coordination testing reveals smooth and accurate finger-nose-finger bilaterally, normal heel-to-shin bilaterally and normal rhythmic rapid alternating movements.  Romberg testing is negative.    Tendon reflex testing was quite unusual.  He had reflex spread throughout the right upper extremity with reflex testing and biceps brachioradialis and to a lesser extent triceps.  Also of note he had contraction into the right shoulder with shoulder abduction, arm flexion, specifically with biceps reflex testing.  This spasm or myoclonic jerk lasted for 2 to 3 seconds before resolving and was consistent with each tap of the tendon.  There was a little bit of reflex spread into the hand with brachioradialis.  I did not see any clinical evidence of    He walks on a normal base with excellent stride length, normal speed, and a normal stride length.  There were no spastic or lateralizing features.  Physical Exam    Assessment/Plan:     Diagnoses and all orders for this visit:    1. Clonus (Primary)  -     MRI Cervical Spine Without Contrast; Future    2. Spasticity  -     MRI Cervical Spine Without Contrast; Future    3. Falls frequently         Obviously, we cannot rule out multiple sources at this point.  However, if there is 1 source for his right arm myoclonus and right leg give way contributing to his falls, it would most likely be cervical.  He does have a history of a disc protrusion at C6-7 on MRI from 6 years ago.  He will need a repeat MRI of the cervical spine to evaluate for myelopathy or cord compression before we expand his differential and workup.  I discussed this with him and we will arrange for his MRI in short order and see him back in clinic afterwards.  (The presentation and exam findings are unusual and a functional source cannot be excluded at this time.)    History of Chiari malformation status post decompression with suboccipital craniectomy 2015.  I reviewed his  most recent brain MRI which reveals no clear contributing issues at this time.    Thank you for the opportunity to participate in his care.    60 minutes total patient care time including extensive record review, examination, discussion, counseling, , and documentation.

## 2024-04-22 ENCOUNTER — PATIENT ROUNDING (BHMG ONLY) (OUTPATIENT)
Dept: NEUROLOGY | Facility: CLINIC | Age: 34
End: 2024-04-22

## 2024-04-29 ENCOUNTER — TELEPHONE (OUTPATIENT)
Dept: NEUROLOGY | Facility: CLINIC | Age: 34
End: 2024-04-29

## 2024-04-29 NOTE — TELEPHONE ENCOUNTER
Caller: Matias Barrientos    Relationship: Self    Best call back number: [phone]    What is the best time to reach you:     Who are you requesting to speak with (clinical staff, provider,  specific staff member):   DR ALEMAN    Do you know the name of the person who called:     What was the call regarding:   PT HAS THE DISC OF HIS MRI OF BRAIN W/WO CONTRAST ON 2-26-24 . PT WOULD LIKE TO HAVE DR GALLOWAY LOOK AT THE ACTUAL IMAGING IN ADDITION TO THE REPORT DR ALEMAN HAS ALREADY SEEN. OK FOR PT TO BRING THIS DISC TO THE OFFICE?    PLEASE CALL PT BACK TO DISCUSS.

## 2024-05-17 ENCOUNTER — OFFICE VISIT (OUTPATIENT)
Dept: NEUROLOGY | Facility: CLINIC | Age: 34
End: 2024-05-17
Payer: MEDICAID

## 2024-05-17 VITALS
SYSTOLIC BLOOD PRESSURE: 108 MMHG | BODY MASS INDEX: 29.16 KG/M2 | WEIGHT: 203.2 LBS | HEART RATE: 67 BPM | OXYGEN SATURATION: 97 % | DIASTOLIC BLOOD PRESSURE: 68 MMHG

## 2024-05-17 DIAGNOSIS — G93.5 CHIARI MALFORMATION TYPE I: ICD-10-CM

## 2024-05-17 DIAGNOSIS — R25.8 CLONUS: Primary | ICD-10-CM

## 2024-05-17 NOTE — PROGRESS NOTES
Notes by MA:  Pt is here today for a follow up and to discuss recent test results.      Subjective:     Patient ID: Matias Barrientos is a 33 y.o. male.    History of Present Illness  The following portions of the patient's history were reviewed and updated as appropriate: allergies, current medications, past family history, past medical history, past social history, past surgical history, and problem list.    Review of Systems   Musculoskeletal:  Negative for gait problem.   Neurological:  Positive for tremors. Negative for dizziness, seizures, syncope, facial asymmetry, speech difficulty, weakness, light-headedness, numbness and headaches.   Psychiatric/Behavioral:  Negative for agitation, behavioral problems, confusion, decreased concentration, dysphoric mood, hallucinations, self-injury, sleep disturbance and suicidal ideas. The patient is not nervous/anxious and is not hyperactive.         Objective:    Neurologic Exam  Awake alert pleasant cooperative oriented in all spheres with fluent speech and normal speech comprehension.     Cranial nerves II through XII normal and symmetric.     Motor exam reveals normal symmetric tone bulk and power throughout without drift or spasticity.  No adventitious movements.     The sensory exam reveals normal and symmetric sensation to light touch, temperature, vibration throughout.     Coordination testing reveals smooth and accurate finger-nose-finger bilaterally, normal heel-to-shin bilaterally and normal rhythmic rapid alternating movements.  Romberg testing is negative.     Tendon reflex testing is now normal 2+ and symmetric in both upper extremities.    He walks on a normal base with excellent stride length, normal speed, and a normal stride length.  There were no spastic or lateralizing features.  Physical Exam    Assessment/Plan:     Diagnoses and all orders for this visit:    1. Clonus (Primary)    2. Chiari malformation type I     Spells of clonus of the right arm have  essentially resolved.  He does not have the reflex Baljit spasms triggered by deep tendon reflex testing noted at our last visit.  He does not have any more sudden drop attacks or falls.  He attributes this to changes in his diet.  I reviewed his MRI of the cervical spine with him which reveals a disc osteophyte complex at C6-7 contacting and indenting the anterior surface of the spinal cord.  I think this is probably the source for his previous complaints which seem to have resolved on their own.  He has declined referrals to physical therapy and to neurosurgery.  He had questions about his brain MRI which I reviewed with him personally in detail today and I reassured her that this is mostly unremarkable study except for expected postsurgical changes from suboccipital craniectomy.  No other changes for now.  Happy to see him back in the future as needed.

## 2024-07-18 ENCOUNTER — OFFICE VISIT (OUTPATIENT)
Dept: FAMILY MEDICINE CLINIC | Facility: CLINIC | Age: 34
End: 2024-07-18

## 2024-07-18 VITALS
OXYGEN SATURATION: 99 % | TEMPERATURE: 98.1 F | BODY MASS INDEX: 28.63 KG/M2 | HEIGHT: 70 IN | WEIGHT: 200 LBS | HEART RATE: 58 BPM | DIASTOLIC BLOOD PRESSURE: 76 MMHG | SYSTOLIC BLOOD PRESSURE: 118 MMHG

## 2024-07-18 DIAGNOSIS — R21 RASH OF FINGER: Primary | ICD-10-CM

## 2024-07-18 RX ORDER — TRIAMCINOLONE ACETONIDE 1 MG/G
1 OINTMENT TOPICAL 2 TIMES DAILY
Qty: 15 G | Refills: 0 | Status: SHIPPED | OUTPATIENT
Start: 2024-07-18

## 2024-07-18 NOTE — PROGRESS NOTES
"Chief Complaint  Rash (Right ring finger for 2 months)    Subjective        Matias Barrientos presents to [facility]  Rash      Patient is a 33-year-old male following up from the urgent care for a rash between the webspaces of his left middle and ring finger.  Patient was seen  On 7/5/2024 for this rash and was given ketoconazole cream.  He has been using the cream every day but the rash has not improved.  He states the rash is slightly painful and pruritic but has not spread to anywhere else on his body.  He also states him and his wife have held hands often and the rash has not spread to her.  He does endorse tiny lesions, may be vesicular in nature, that pop up and then drain along the rash.  There are none present today.    Objective   Vital Signs:  /76   Pulse 58   Temp 98.1 °F (36.7 °C)   Ht 177.8 cm (70\")   Wt 90.7 kg (200 lb)   SpO2 99%   BMI 28.70 kg/m²   Estimated body mass index is 28.7 kg/m² as calculated from the following:    Height as of this encounter: 177.8 cm (70\").    Weight as of this encounter: 90.7 kg (200 lb).               Physical Exam  Constitutional:       General: He is not in acute distress.     Appearance: Normal appearance. He is not ill-appearing or toxic-appearing.   HENT:      Head: Normocephalic and atraumatic.   Cardiovascular:      Rate and Rhythm: Normal rate and regular rhythm.   Pulmonary:      Effort: Pulmonary effort is normal.      Breath sounds: Normal breath sounds.   Skin:     General: Skin is warm and dry.      Findings: Rash present. Rash is macular and scaling. Rash is not pustular, urticarial or vesicular.      Comments: Macular, scaling rash in the webspaces between left middle and ring finger.     Neurological:      General: No focal deficit present.      Mental Status: He is alert and oriented to person, place, and time.   Psychiatric:         Mood and Affect: Mood normal.         Behavior: Behavior normal.         Judgment: " Judgment normal.        Result Review :                     Assessment and Plan     Diagnoses and all orders for this visit:    1. Rash of finger (Primary)  -     triamcinolone (KENALOG) 0.1 % ointment; Apply 1 Application topically to the appropriate area as directed 2 (Two) Times a Day.  Dispense: 15 g; Refill: 0      Discontinue ketoconazole cream.  We will trial triamcinolone cream.  Patient to follow-up in 1 week if no improvement.  Discussed referral to dermatology if rash persists.       Follow Up     No follow-ups on file.  Patient was given instructions and counseling regarding his condition or for health maintenance advice. Please see specific information pulled into the AVS if appropriate.

## 2025-02-08 ENCOUNTER — APPOINTMENT (OUTPATIENT)
Dept: GENERAL RADIOLOGY | Facility: HOSPITAL | Age: 35
End: 2025-02-08
Payer: MEDICAID

## 2025-02-08 ENCOUNTER — HOSPITAL ENCOUNTER (EMERGENCY)
Facility: HOSPITAL | Age: 35
Discharge: HOME | End: 2025-02-08
Attending: STUDENT IN AN ORGANIZED HEALTH CARE EDUCATION/TRAINING PROGRAM
Payer: MEDICAID

## 2025-02-08 VITALS
DIASTOLIC BLOOD PRESSURE: 83 MMHG | RESPIRATION RATE: 16 BRPM | BODY MASS INDEX: 28.63 KG/M2 | HEIGHT: 70 IN | WEIGHT: 200 LBS | TEMPERATURE: 98.6 F | OXYGEN SATURATION: 98 % | HEART RATE: 81 BPM | SYSTOLIC BLOOD PRESSURE: 124 MMHG

## 2025-02-08 DIAGNOSIS — J06.9 VIRAL URI: Primary | ICD-10-CM

## 2025-02-08 LAB
FLUAV RNA RESP QL NAA+PROBE: NOT DETECTED
FLUBV RNA RESP QL NAA+PROBE: NOT DETECTED
RSV RNA RESP QL NAA+PROBE: NOT DETECTED
SARS-COV-2 RNA RESP QL NAA+PROBE: NOT DETECTED

## 2025-02-08 PROCEDURE — 71045 X-RAY EXAM CHEST 1 VIEW: CPT

## 2025-02-08 PROCEDURE — 87637 SARSCOV2&INF A&B&RSV AMP PRB: CPT | Performed by: STUDENT IN AN ORGANIZED HEALTH CARE EDUCATION/TRAINING PROGRAM

## 2025-02-08 PROCEDURE — 25010000002 DEXAMETHASONE PER 1 MG: Performed by: STUDENT IN AN ORGANIZED HEALTH CARE EDUCATION/TRAINING PROGRAM

## 2025-02-08 PROCEDURE — 96372 THER/PROPH/DIAG INJ SC/IM: CPT

## 2025-02-08 PROCEDURE — 25010000002 KETOROLAC TROMETHAMINE PER 15 MG: Performed by: STUDENT IN AN ORGANIZED HEALTH CARE EDUCATION/TRAINING PROGRAM

## 2025-02-08 PROCEDURE — 99283 EMERGENCY DEPT VISIT LOW MDM: CPT | Performed by: STUDENT IN AN ORGANIZED HEALTH CARE EDUCATION/TRAINING PROGRAM

## 2025-02-08 RX ORDER — ACETAMINOPHEN 500 MG
1000 TABLET ORAL ONCE
Status: COMPLETED | OUTPATIENT
Start: 2025-02-08 | End: 2025-02-08

## 2025-02-08 RX ORDER — ONDANSETRON 2 MG/ML
4 INJECTION INTRAMUSCULAR; INTRAVENOUS ONCE
Status: DISCONTINUED | OUTPATIENT
Start: 2025-02-08 | End: 2025-02-08

## 2025-02-08 RX ORDER — KETOROLAC TROMETHAMINE 30 MG/ML
30 INJECTION, SOLUTION INTRAMUSCULAR; INTRAVENOUS ONCE
Status: COMPLETED | OUTPATIENT
Start: 2025-02-08 | End: 2025-02-08

## 2025-02-08 RX ORDER — MELOXICAM 7.5 MG/1
15 TABLET ORAL DAILY
Qty: 14 TABLET | Refills: 0 | Status: SHIPPED | OUTPATIENT
Start: 2025-02-08 | End: 2025-02-15

## 2025-02-08 RX ORDER — DEXAMETHASONE SODIUM PHOSPHATE 4 MG/ML
10 INJECTION, SOLUTION INTRA-ARTICULAR; INTRALESIONAL; INTRAMUSCULAR; INTRAVENOUS; SOFT TISSUE ONCE
Status: COMPLETED | OUTPATIENT
Start: 2025-02-08 | End: 2025-02-08

## 2025-02-08 RX ORDER — ACETAMINOPHEN 500 MG
1000 TABLET ORAL EVERY 6 HOURS PRN
Qty: 30 TABLET | Refills: 0 | Status: SHIPPED | OUTPATIENT
Start: 2025-02-08 | End: 2025-02-15

## 2025-02-08 RX ORDER — BROMPHENIRAMINE MALEATE, PSEUDOEPHEDRINE HYDROCHLORIDE, AND DEXTROMETHORPHAN HYDROBROMIDE 2; 30; 10 MG/5ML; MG/5ML; MG/5ML
5 SYRUP ORAL 4 TIMES DAILY PRN
Qty: 118 ML | Refills: 0 | Status: SHIPPED | OUTPATIENT
Start: 2025-02-08 | End: 2025-02-13

## 2025-02-08 RX ADMIN — ACETAMINOPHEN 1000 MG: 500 TABLET ORAL at 01:17

## 2025-02-08 RX ADMIN — DEXAMETHASONE SODIUM PHOSPHATE 10 MG: 4 INJECTION, SOLUTION INTRAMUSCULAR; INTRAVENOUS at 01:17

## 2025-02-08 RX ADMIN — KETOROLAC TROMETHAMINE 30 MG: 30 INJECTION, SOLUTION INTRAMUSCULAR; INTRAVENOUS at 01:17

## 2025-02-08 NOTE — ED PROVIDER NOTES
Subjective   History of Present Illness  34 male hx hypothryoidism presents to ed w/ cc of dyspnea; associated sx of stridor, occurring in context of several days of preceding cough, myalgias since . Denies chest, back, abd pain, syncope, n/v/d/c. ROS otherwise neg. Vitals are wnls. On exam pt is well-appearing, has upper airway sounds that dissipate once examiner leaves the room; able to speak in full sentences once examiner leaves room, his lungs are clear to auscultation. No peripheral edema.   Review of Systems    Past Medical History:   Diagnosis Date    Difficulty walking     I tend to fall while walking. I have fallen 38 times since 2024.    Genital herpes     History of Chiari malformation     Hypothyroidism     Migraine TBD    I have had migraines historically, up till my Chiari Malformation decompression surgery on (3/16/15).    Seizures 2024    Possible seizure, I collapsed and lost some time (appx. 5 min). When I came to I was still shaking.       No Known Allergies    Past Surgical History:   Procedure Laterality Date    BRAIN SURGERY      HERNIA REPAIR  2021    SHOULDER ARTHROSCOPY Left        Family History   Problem Relation Age of Onset    Parkinsonism Maternal Grandfather          of Parkinson's in 2017       Social History     Socioeconomic History    Marital status:    Tobacco Use    Smoking status: Never     Passive exposure: Never    Smokeless tobacco: Never   Vaping Use    Vaping status: Never Used   Substance and Sexual Activity    Alcohol use: Never    Drug use: Never    Sexual activity: Yes     Partners: Female     Birth control/protection: Condom           Objective   Physical Exam    Procedures           ED Course                                                       Medical Decision Making  Problems Addressed:  Viral URI: complicated acute illness or injury    Amount and/or Complexity of Data Reviewed  Radiology: ordered.    Risk  OTC  drugs.  Prescription drug management.      34 male hx hypothryoidism presents to ed w/ cc of dyspnea; associated sx of stridor, occurring in context of several days of preceding cough, myalgias since Sunday. Denies chest, back, abd pain, syncope, n/v/d/c. ROS otherwise neg. Vitals are wnls. On exam pt is well-appearing, has upper airway sounds that dissipate once examiner leaves the room; able to speak in full sentences once examiner leaves room, his lungs are clear to auscultation. No peripheral edema.     Will eval for clinically significant airway narrowing with chest x-ray; tx w/ decadron IM, tylenol PO, toradol IM. Dc with pcp fu.    Cxr clear, pending swabs.      Hst pt  Dsp, home  Final diagnoses:   Viral URI       ED Disposition  ED Disposition       ED Disposition   Discharge    Condition   Stable    Comment   --               Jovi Li, DO  [address] [facility] [address] [phone]    In 2 days           Medication List        New Prescriptions      acetaminophen 500 MG tablet  Commonly known as: TYLENOL  Take 2 tablets by mouth Every 6 (Six) Hours As Needed for Mild Pain for up to 7 days.     brompheniramine-pseudoephedrine-DM 30-2-10 MG/5ML syrup  Take 5 mL by mouth 4 (Four) Times a Day As Needed for Allergies for up to 5 days.     meloxicam 7.5 MG tablet  Commonly known as: MOBIC  Take 2 tablets by mouth Daily for 7 days.               Where to Get Your Medications        These medications were sent to [facility] [address] [place] [address] [address] [facility] [phone] [place] [id number] [address] [address]      Phone: [phone]   acetaminophen 500 MG tablet  brompheniramine-pseudoephedrine-DM 30-2-10 MG/5ML syrup  meloxicam 7.5 MG tablet            Timothy Butts MD  02/08/25 0205

## 2025-02-12 ENCOUNTER — OFFICE VISIT (OUTPATIENT)
Dept: FAMILY MEDICINE CLINIC | Facility: CLINIC | Age: 35
End: 2025-02-12

## 2025-02-12 VITALS
OXYGEN SATURATION: 98 % | HEART RATE: 88 BPM | WEIGHT: 202 LBS | HEIGHT: 70 IN | SYSTOLIC BLOOD PRESSURE: 84 MMHG | TEMPERATURE: 98.6 F | BODY MASS INDEX: 28.92 KG/M2 | DIASTOLIC BLOOD PRESSURE: 68 MMHG

## 2025-02-12 DIAGNOSIS — E03.9 HYPOTHYROIDISM, UNSPECIFIED TYPE: ICD-10-CM

## 2025-02-12 DIAGNOSIS — R05.8 POST-VIRAL COUGH SYNDROME: ICD-10-CM

## 2025-02-12 DIAGNOSIS — I95.9 HYPOTENSION, UNSPECIFIED HYPOTENSION TYPE: Primary | ICD-10-CM

## 2025-02-12 PROCEDURE — 36415 COLL VENOUS BLD VENIPUNCTURE: CPT | Performed by: STUDENT IN AN ORGANIZED HEALTH CARE EDUCATION/TRAINING PROGRAM

## 2025-02-12 PROCEDURE — 99214 OFFICE O/P EST MOD 30 MIN: CPT | Performed by: STUDENT IN AN ORGANIZED HEALTH CARE EDUCATION/TRAINING PROGRAM

## 2025-02-12 RX ORDER — PREDNISONE 20 MG/1
TABLET ORAL
Qty: 19 TABLET | Refills: 0 | Status: SHIPPED | OUTPATIENT
Start: 2025-02-12 | End: 2025-02-22

## 2025-02-12 RX ORDER — AZITHROMYCIN 250 MG/1
TABLET, FILM COATED ORAL
Qty: 6 TABLET | Refills: 0 | Status: SHIPPED | OUTPATIENT
Start: 2025-02-12

## 2025-02-12 RX ORDER — MONTELUKAST SODIUM 10 MG/1
10 TABLET ORAL NIGHTLY
Qty: 15 TABLET | Refills: 0 | Status: SHIPPED | OUTPATIENT
Start: 2025-02-12

## 2025-02-12 NOTE — PROGRESS NOTES
Venipuncture Blood Specimen Collection  Venipuncture performed in left arm by Jess Simpson MA with good hemostasis. Patient tolerated the procedure well without complications.   02/12/25   Jess Simpson MA

## 2025-02-13 LAB
ALBUMIN SERPL-MCNC: 4.5 G/DL (ref 3.5–5.2)
ALBUMIN/GLOB SERPL: 1.4 G/DL
ALP SERPL-CCNC: 94 U/L (ref 39–117)
ALT SERPL-CCNC: 45 U/L (ref 1–41)
AST SERPL-CCNC: 28 U/L (ref 1–40)
BASOPHILS # BLD AUTO: 0.06 10*3/MM3 (ref 0–0.2)
BASOPHILS NFR BLD AUTO: 0.8 % (ref 0–1.5)
BILIRUB SERPL-MCNC: 0.4 MG/DL (ref 0–1.2)
BUN SERPL-MCNC: 16 MG/DL (ref 6–20)
BUN/CREAT SERPL: 13.8 (ref 7–25)
CALCIUM SERPL-MCNC: 10 MG/DL (ref 8.6–10.5)
CHLORIDE SERPL-SCNC: 101 MMOL/L (ref 98–107)
CO2 SERPL-SCNC: 22.9 MMOL/L (ref 22–29)
CREAT SERPL-MCNC: 1.16 MG/DL (ref 0.76–1.27)
EGFRCR SERPLBLD CKD-EPI 2021: 85.3 ML/MIN/1.73
EOSINOPHIL # BLD AUTO: 0.19 10*3/MM3 (ref 0–0.4)
EOSINOPHIL NFR BLD AUTO: 2.7 % (ref 0.3–6.2)
ERYTHROCYTE [DISTWIDTH] IN BLOOD BY AUTOMATED COUNT: 11.7 % (ref 12.3–15.4)
GLOBULIN SER CALC-MCNC: 3.3 GM/DL
GLUCOSE SERPL-MCNC: 86 MG/DL (ref 65–99)
HCT VFR BLD AUTO: 48 % (ref 37.5–51)
HGB BLD-MCNC: 16.4 G/DL (ref 13–17.7)
IMM GRANULOCYTES # BLD AUTO: 0.05 10*3/MM3 (ref 0–0.05)
IMM GRANULOCYTES NFR BLD AUTO: 0.7 % (ref 0–0.5)
LYMPHOCYTES # BLD AUTO: 1.41 10*3/MM3 (ref 0.7–3.1)
LYMPHOCYTES NFR BLD AUTO: 19.7 % (ref 19.6–45.3)
MCH RBC QN AUTO: 30.7 PG (ref 26.6–33)
MCHC RBC AUTO-ENTMCNC: 34.2 G/DL (ref 31.5–35.7)
MCV RBC AUTO: 89.9 FL (ref 79–97)
MONOCYTES # BLD AUTO: 0.44 10*3/MM3 (ref 0.1–0.9)
MONOCYTES NFR BLD AUTO: 6.2 % (ref 5–12)
NEUTROPHILS # BLD AUTO: 4.99 10*3/MM3 (ref 1.7–7)
NEUTROPHILS NFR BLD AUTO: 69.9 % (ref 42.7–76)
NRBC BLD AUTO-RTO: 0 /100 WBC (ref 0–0.2)
PLATELET # BLD AUTO: 266 10*3/MM3 (ref 140–450)
POTASSIUM SERPL-SCNC: 5 MMOL/L (ref 3.5–5.2)
PROT SERPL-MCNC: 7.8 G/DL (ref 6–8.5)
RBC # BLD AUTO: 5.34 10*6/MM3 (ref 4.14–5.8)
SODIUM SERPL-SCNC: 140 MMOL/L (ref 136–145)
T3FREE SERPL-MCNC: 3.8 PG/ML (ref 2–4.4)
T4 FREE SERPL-MCNC: 1.5 NG/DL (ref 0.92–1.68)
THYROGLOB AB SERPL-ACNC: 48.9 IU/ML (ref 0–0.9)
THYROPEROXIDASE AB SERPL-ACNC: 42 IU/ML (ref 0–34)
TSH SERPL DL<=0.005 MIU/L-ACNC: 3.58 UIU/ML (ref 0.27–4.2)
WBC # BLD AUTO: 7.14 10*3/MM3 (ref 3.4–10.8)

## 2025-02-28 NOTE — PROGRESS NOTES
Jovi Li DO  [facility]  [address]  [place] [id number] [phone]    Subjective      Name Matias Barrientos MRN [id number]    1990 AGE/SEX 34 y.o. / male      Chief Complaint Chief Complaint   Patient presents with    Flu Symptoms     Was diagnosed with URI. Symptoms do not seem to be improving cough and shortness of breath. Symptoms started 10 days ago. Feels very tired          Visit History for  2025    Matias Barrientos is a 34 y.o. male who presented today for Flu Symptoms (Was diagnosed with URI. Symptoms do not seem to be improving cough and shortness of breath. Symptoms started 10 days ago. Feels very tired )       History of Present Illness  He reports that his primary concern is a cough that developed following an illness, suspected to be influenza, which occurred approximately 10 to 11 days ago. Despite the resolution of other symptoms, the cough has persisted. He describes the cough as severe, sometimes dry and sometimes productive, and notes that it is particularly worse at night and upon waking, often disrupting his sleep. He also experienced throat itchiness on the Thursday and Friday prior to his ER visit. He reports no recent fevers. He continues to take the prescribed cough medication but reports no significant improvement. He also takes Mucinex DM twice daily. He reports feeling fatigued and notes that his blood pressure was slightly elevated during his ER visit on Saturday morning. He has been maintaining adequate hydration. Last Friday, he experienced a sudden onset of dyspnea during the night, necessitating an emergency room visit. He received a steroid injection in the ER, which provided temporary relief; however, his symptoms worsened on .    He has been monitoring his heart rate with a Fitbit, which has shown an elevated heart rate since his illness. Prior to this, his heart rate was recorded as 38 during sleep.      "  Medications and Allergies   Current Outpatient Medications   Medication Instructions    azithromycin (Zithromax Z-Radu) 250 MG tablet Take 2 tablets by mouth on day 1, then 1 tablet daily on days 2-5    montelukast (SINGULAIR) 10 mg, Oral, Nightly    triamcinolone (KENALOG) 0.1 % ointment 1 Application, Topical, 2 Times Daily     No Known Allergies   I have reviewed the above medications and allergies     Objective:      Vitals Vitals:    02/12/25 0952   BP: (!) 84/68  Comment: BP was checke don both arms. right arm was 84/62   BP Location: Left arm   Patient Position: Sitting   Cuff Size: Adult   Pulse: 88   Temp: 98.6 °F (37 °C)   TempSrc: Oral   SpO2: 98%   Weight: 91.6 kg (202 lb)   Height: 177.8 cm (70\")     Body mass index is 28.98 kg/m².    Physical Exam  Vitals reviewed.   Constitutional:       General: He is not in acute distress.     Appearance: He is not ill-appearing.   Pulmonary:      Effort: Pulmonary effort is normal.   Psychiatric:         Mood and Affect: Mood normal.         Behavior: Behavior normal.         Thought Content: Thought content normal.         Judgment: Judgment normal.          Physical Exam  The ear appears red and irritated.    Vital Signs  Blood pressure is low.     Results       Assessment/Plan   Issues Addressed/ Plan   Diagnosis Plan   1. Hypotension, unspecified hypotension type  CBC w AUTO Differential    Comprehensive metabolic panel      2. Post-viral cough syndrome  azithromycin (Zithromax Z-Radu) 250 MG tablet    predniSONE (DELTASONE) 20 MG tablet    montelukast (SINGULAIR) 10 MG tablet      3. Hypothyroidism, unspecified type  TSH    T4, free    T3, free    Thyroid Antibodies         Assessment & Plan  1. Postviral cough.  The patient reports a persistent cough that worsens at night and upon waking, following a flu-like illness 10-11 days ago. He experienced significant coughing and throat irritation, which led to an ER visit where he received a steroid shot. The cough " has not improved with Mucinex DM. His blood pressure is low, and he reports fatigue. The patient was informed that the Mucinex DM could be contributing to his elevated heart rate. A comprehensive blood panel will be ordered to assess his white blood cell count and other relevant parameters. He will be prescribed montelukast 10 mg to be taken nightly for the next 2 weeks. Additionally, a course of prednisone will be initiated, starting with 60 mg for 3 days, followed by 40 mg for the subsequent 3 days, and finally 20 mg for the remaining 5 days. He is advised to discontinue the use of Mucinex DM and instead use Delsym (dextromethorphan) as needed for cough suppression.    2. Elevated heart rate.  The patient reports an elevated heart rate noted by his Fitbit during his illness. This may be related to the use of Mucinex DM. He is advised to discontinue Mucinex DM.     BMI is >= 25 and <30. (Overweight) The following options were offered after discussion;: exercise counseling/recommendations and nutrition counseling/recommendations     There are no Patient Instructions on file for this visit.   Follow up  recommended Return in about 3 months (around 5/12/2025), or if symptoms worsen or fail to improve.   - Dragon voice recognition software was utilized to complete this chart.  Every reasonable attempt was made to edit and correct the text, however some incorrect words may remain.   Jovi Li DO    Patient or patient representative verbalized consent for the use of Ambient Listening during the visit with  Jovi Li DO for chart documentation. 2/27/2025  20:45 EST